# Patient Record
Sex: MALE | Race: WHITE | NOT HISPANIC OR LATINO | Employment: OTHER | URBAN - METROPOLITAN AREA
[De-identification: names, ages, dates, MRNs, and addresses within clinical notes are randomized per-mention and may not be internally consistent; named-entity substitution may affect disease eponyms.]

---

## 2023-01-01 ENCOUNTER — OFFICE VISIT (OUTPATIENT)
Dept: CARDIOLOGY | Facility: CLINIC | Age: 88
End: 2023-01-01
Payer: MEDICARE

## 2023-01-01 ENCOUNTER — TELEPHONE (OUTPATIENT)
Dept: PRIMARY CARE | Facility: CLINIC | Age: 88
End: 2023-01-01
Payer: MEDICARE

## 2023-01-01 VITALS — OXYGEN SATURATION: 93 % | HEART RATE: 88 BPM | SYSTOLIC BLOOD PRESSURE: 128 MMHG | DIASTOLIC BLOOD PRESSURE: 68 MMHG

## 2023-01-01 DIAGNOSIS — G92.8 TOXIC METABOLIC ENCEPHALOPATHY: ICD-10-CM

## 2023-01-01 DIAGNOSIS — D63.8 ANEMIA OF CHRONIC DISEASE: ICD-10-CM

## 2023-01-01 DIAGNOSIS — I35.0 NONRHEUMATIC AORTIC VALVE STENOSIS: ICD-10-CM

## 2023-01-01 DIAGNOSIS — I48.0 PAROXYSMAL ATRIAL FIBRILLATION (MULTI): ICD-10-CM

## 2023-01-01 DIAGNOSIS — I50.20 CONGESTIVE HEART FAILURE WITH LEFT VENTRICULAR SYSTOLIC DYSFUNCTION (LVSD) (MULTI): ICD-10-CM

## 2023-01-01 DIAGNOSIS — I48.91 ATRIAL FIBRILLATION, UNSPECIFIED TYPE (MULTI): Primary | ICD-10-CM

## 2023-01-01 DIAGNOSIS — I50.9 HEART FAILURE, UNSPECIFIED (MULTI): ICD-10-CM

## 2023-01-01 DIAGNOSIS — I73.9 PAD (PERIPHERAL ARTERY DISEASE) (CMS-HCC): ICD-10-CM

## 2023-01-01 DIAGNOSIS — I42.9 CARDIOMYOPATHY, UNSPECIFIED TYPE (MULTI): ICD-10-CM

## 2023-01-01 DIAGNOSIS — I50.42 CHRONIC COMBINED SYSTOLIC AND DIASTOLIC CONGESTIVE HEART FAILURE (MULTI): Primary | ICD-10-CM

## 2023-01-01 PROCEDURE — 99214 OFFICE O/P EST MOD 30 MIN: CPT | Performed by: INTERNAL MEDICINE

## 2023-01-01 PROCEDURE — 1126F AMNT PAIN NOTED NONE PRSNT: CPT | Performed by: INTERNAL MEDICINE

## 2023-01-01 PROCEDURE — 1159F MED LIST DOCD IN RCRD: CPT | Performed by: INTERNAL MEDICINE

## 2023-01-01 RX ORDER — FERROUS SULFATE 325(65) MG
1 TABLET, DELAYED RELEASE (ENTERIC COATED) ORAL EVERY OTHER DAY
COMMUNITY
End: 2023-01-01 | Stop reason: SDUPTHER

## 2023-01-01 RX ORDER — ADHESIVE BANDAGE
5 BANDAGE TOPICAL 4 TIMES DAILY PRN
COMMUNITY
Start: 2022-02-03 | End: 2024-01-01 | Stop reason: ALTCHOICE

## 2023-01-01 RX ORDER — POTASSIUM CHLORIDE 20 MEQ/1
1 TABLET, EXTENDED RELEASE ORAL DAILY
COMMUNITY
Start: 2022-02-03 | End: 2023-01-01 | Stop reason: SDUPTHER

## 2023-01-01 RX ORDER — METOPROLOL SUCCINATE 50 MG/1
1 TABLET, EXTENDED RELEASE ORAL DAILY
COMMUNITY
Start: 2022-02-03

## 2023-01-01 RX ORDER — ACETAMINOPHEN 325 MG/1
2 TABLET ORAL AS NEEDED
COMMUNITY
Start: 2020-06-24

## 2023-01-01 RX ORDER — METOPROLOL TARTRATE 25 MG/1
TABLET, FILM COATED ORAL
COMMUNITY
End: 2024-01-01 | Stop reason: ALTCHOICE

## 2023-01-01 RX ORDER — DIGOXIN 125 MCG
125 TABLET ORAL DAILY
Qty: 90 TABLET | Refills: 2 | Status: SHIPPED | OUTPATIENT
Start: 2023-01-01 | End: 2023-01-01 | Stop reason: SDUPTHER

## 2023-01-01 RX ORDER — ATORVASTATIN CALCIUM 40 MG/1
1 TABLET, FILM COATED ORAL DAILY
COMMUNITY
Start: 2022-02-08

## 2023-01-01 RX ORDER — FUROSEMIDE 40 MG/1
1 TABLET ORAL DAILY
COMMUNITY
Start: 2022-02-03 | End: 2023-01-01 | Stop reason: SDUPTHER

## 2023-01-01 RX ORDER — LEVETIRACETAM 500 MG/1
1 TABLET ORAL 2 TIMES DAILY
COMMUNITY
Start: 2020-06-24

## 2023-01-01 RX ORDER — MUPIROCIN 20 MG/G
OINTMENT TOPICAL
COMMUNITY
Start: 2022-04-11 | End: 2024-01-01 | Stop reason: ALTCHOICE

## 2023-01-01 RX ORDER — DOCUSATE SODIUM 100 MG/1
1 CAPSULE, LIQUID FILLED ORAL EVERY OTHER DAY
COMMUNITY
Start: 2022-02-03 | End: 2024-01-01 | Stop reason: ALTCHOICE

## 2023-01-01 RX ORDER — IPRATROPIUM BROMIDE AND ALBUTEROL SULFATE 2.5; .5 MG/3ML; MG/3ML
SOLUTION RESPIRATORY (INHALATION) EVERY 6 HOURS
COMMUNITY
Start: 2022-02-03 | End: 2024-01-01 | Stop reason: ALTCHOICE

## 2023-01-01 RX ORDER — DOXYCYCLINE 100 MG/1
1 TABLET ORAL EVERY 12 HOURS
COMMUNITY
Start: 2022-05-18

## 2023-01-01 RX ORDER — OMEPRAZOLE 20 MG/1
1 CAPSULE, DELAYED RELEASE ORAL
COMMUNITY
Start: 2022-02-03 | End: 2023-01-01 | Stop reason: SDUPTHER

## 2023-01-01 RX ORDER — DIGOXIN 125 MCG
1 TABLET ORAL DAILY
COMMUNITY
Start: 2022-02-03 | End: 2023-01-01

## 2023-01-01 RX ORDER — DAPAGLIFLOZIN 5 MG/1
1 TABLET, FILM COATED ORAL DAILY
COMMUNITY

## 2023-01-01 RX ORDER — LOSARTAN POTASSIUM 25 MG/1
1 TABLET ORAL DAILY
COMMUNITY
Start: 2021-10-02 | End: 2023-01-01

## 2023-01-01 RX ORDER — FUROSEMIDE 40 MG/1
40 TABLET ORAL DAILY
Qty: 90 TABLET | Refills: 2 | Status: SHIPPED | OUTPATIENT
Start: 2023-01-01 | End: 2024-03-19

## 2023-01-01 RX ORDER — LANOLIN ALCOHOL/MO/W.PET/CERES
1 CREAM (GRAM) TOPICAL DAILY
COMMUNITY
End: 2024-01-01 | Stop reason: ALTCHOICE

## 2023-01-01 RX ORDER — POTASSIUM CHLORIDE 20 MEQ/1
20 TABLET, EXTENDED RELEASE ORAL DAILY
Qty: 90 TABLET | Refills: 2 | Status: SHIPPED | OUTPATIENT
Start: 2023-01-01 | End: 2024-03-19

## 2023-01-01 RX ORDER — DIGOXIN 125 MCG
125 TABLET ORAL DAILY
Qty: 90 TABLET | Refills: 2 | Status: SHIPPED | OUTPATIENT
Start: 2023-01-01 | End: 2024-03-19

## 2023-01-01 RX ORDER — OMEPRAZOLE 20 MG/1
20 CAPSULE, DELAYED RELEASE ORAL
Qty: 90 CAPSULE | Refills: 2 | Status: SHIPPED | OUTPATIENT
Start: 2023-01-01 | End: 2024-03-19

## 2023-01-01 RX ORDER — NAPROXEN SODIUM 220 MG/1
1 TABLET, FILM COATED ORAL DAILY
COMMUNITY

## 2023-01-01 RX ORDER — LOSARTAN POTASSIUM 25 MG/1
25 TABLET ORAL DAILY
Qty: 90 TABLET | Refills: 2 | Status: SHIPPED | OUTPATIENT
Start: 2023-01-01 | End: 2024-03-19

## 2023-01-01 RX ORDER — PANTOPRAZOLE SODIUM 40 MG/1
1 TABLET, DELAYED RELEASE ORAL DAILY
COMMUNITY
End: 2024-01-01 | Stop reason: WASHOUT

## 2023-01-01 RX ORDER — FERROUS SULFATE 325(65) MG
1 TABLET, DELAYED RELEASE (ENTERIC COATED) ORAL EVERY OTHER DAY
Qty: 36 TABLET | Refills: 2 | Status: SHIPPED | OUTPATIENT
Start: 2023-01-01 | End: 2024-03-19

## 2023-01-01 ASSESSMENT — ENCOUNTER SYMPTOMS
MYALGIAS: 0
WEIGHT GAIN: 0
WHEEZING: 0
OCCASIONAL FEELINGS OF UNSTEADINESS: 1
FEVER: 0
WEAKNESS: 0
PALPITATIONS: 0
WEIGHT LOSS: 0
NEAR-SYNCOPE: 0
DEPRESSION: 0
COUGH: 0
SYNCOPE: 0
IRREGULAR HEARTBEAT: 0
CLAUDICATION: 0
PND: 0
LOSS OF SENSATION IN FEET: 0
DIZZINESS: 0
SHORTNESS OF BREATH: 0
ORTHOPNEA: 0
DYSPNEA ON EXERTION: 0
DIAPHORESIS: 0

## 2023-01-01 ASSESSMENT — PAIN SCALES - GENERAL: PAINLEVEL: 0-NO PAIN

## 2023-08-29 PROBLEM — R73.01 IMPAIRED FASTING GLUCOSE: Status: ACTIVE | Noted: 2023-01-01

## 2023-08-29 PROBLEM — I42.9 CARDIOMYOPATHY (MULTI): Status: ACTIVE | Noted: 2023-01-01

## 2023-08-29 PROBLEM — R06.09 DOE (DYSPNEA ON EXERTION): Status: ACTIVE | Noted: 2023-01-01

## 2023-08-29 PROBLEM — R44.3 HALLUCINATIONS: Status: ACTIVE | Noted: 2023-01-01

## 2023-08-29 PROBLEM — A41.01 MSSA (METHICILLIN SUSCEPTIBLE STAPHYLOCOCCUS AUREUS) SEPTICEMIA (MULTI): Status: ACTIVE | Noted: 2023-01-01

## 2023-08-29 PROBLEM — I51.89 DIASTOLIC DYSFUNCTION: Status: ACTIVE | Noted: 2023-01-01

## 2023-08-29 PROBLEM — I50.20 CONGESTIVE HEART FAILURE WITH LEFT VENTRICULAR SYSTOLIC DYSFUNCTION (LVSD) (MULTI): Status: ACTIVE | Noted: 2023-01-01

## 2023-08-29 PROBLEM — I35.0 AORTIC STENOSIS: Status: ACTIVE | Noted: 2023-01-01

## 2023-08-29 PROBLEM — I60.9 INTRACRANIAL SUBARACHNOID HEMORRHAGE (MULTI): Status: ACTIVE | Noted: 2023-01-01

## 2023-08-29 PROBLEM — I87.2 PERIPHERAL VENOUS INSUFFICIENCY: Status: ACTIVE | Noted: 2023-01-01

## 2023-08-29 PROBLEM — A49.01 METHICILLIN SUSCEPTIBLE STAPHYLOCOCCUS AUREUS INFECTION: Status: ACTIVE | Noted: 2023-01-01

## 2023-08-29 PROBLEM — R41.82 ALTERED MENTAL STATUS: Status: ACTIVE | Noted: 2023-01-01

## 2023-08-29 PROBLEM — I35.9 AORTIC VALVE DISORDER: Status: ACTIVE | Noted: 2023-01-01

## 2023-08-29 PROBLEM — Z86.19 HISTORY OF INFECTIOUS DISEASE: Status: ACTIVE | Noted: 2023-01-01

## 2023-08-29 PROBLEM — E78.00 PURE HYPERCHOLESTEROLEMIA: Status: ACTIVE | Noted: 2023-01-01

## 2023-08-29 PROBLEM — T84.50XA PROSTHETIC JOINT INFECTION (CMS-HCC): Status: ACTIVE | Noted: 2023-01-01

## 2023-08-29 PROBLEM — J30.0 VASOMOTOR RHINITIS: Status: ACTIVE | Noted: 2023-01-01

## 2023-08-29 PROBLEM — R26.89 OTHER ABNORMALITIES OF GAIT AND MOBILITY: Status: ACTIVE | Noted: 2023-01-01

## 2023-08-29 PROBLEM — D63.8 ANEMIA OF CHRONIC DISEASE: Status: ACTIVE | Noted: 2023-01-01

## 2023-08-29 PROBLEM — M19.90 OSTEOARTHROSIS: Status: ACTIVE | Noted: 2023-01-01

## 2023-08-29 PROBLEM — I73.9 PAD (PERIPHERAL ARTERY DISEASE) (CMS-HCC): Status: ACTIVE | Noted: 2023-01-01

## 2023-08-29 PROBLEM — I48.91 AFIB (MULTI): Status: ACTIVE | Noted: 2023-01-01

## 2023-08-29 PROBLEM — E53.8 VITAMIN B12 DEFICIENCY: Status: ACTIVE | Noted: 2023-01-01

## 2023-08-29 PROBLEM — G92.8 TOXIC METABOLIC ENCEPHALOPATHY: Status: ACTIVE | Noted: 2023-01-01

## 2023-08-29 PROBLEM — C61 MALIGNANT TUMOR OF PROSTATE (MULTI): Status: ACTIVE | Noted: 2023-01-01

## 2023-08-29 PROBLEM — A41.9 SEPSIS (MULTI): Status: ACTIVE | Noted: 2023-01-01

## 2023-10-09 NOTE — TELEPHONE ENCOUNTER
Requesting 90 day refill for Digoxin 125 mcg once a day to go to [facility] on [address] in [place]. Lov 6/23. Nov 2/24.

## 2023-10-18 NOTE — TELEPHONE ENCOUNTER
[facility] requesting 90 day refills on Omeprazole 20 mg once a day, Klor-con 20 meq once a day, Furosemide 40 mg once a day, and ferrous sulfate 325 mg 1 tablet M-W-F. LOV 6/23. NOV 4/24.

## 2023-11-07 NOTE — PROGRESS NOTES
Subjective      Chief Complaint   Patient presents with    Follow-up        89-year-old male with history of atrial fibrillation status post Watchman left atrial appendage occluder placement remotely. He also has severe aortic stenosis. 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 He is rather debilitated from a functional standpoint. At one point he was evaluated by our structural heart team for TAVR. It was decided that he was not a candidate. He also has severe PAD for which he sees Dr Goldberg, had RLE atherectomy without stenting. I saw him 1 yr ago. He ambulates with a rolator, functionally limited more so by his knees than anything.          Review of Systems   Constitutional: Negative for diaphoresis, fever, weight gain and weight loss.   Eyes:  Negative for visual disturbance.   Cardiovascular:  Negative for chest pain, claudication, dyspnea on exertion, irregular heartbeat, leg swelling, near-syncope, orthopnea, palpitations, paroxysmal nocturnal dyspnea and syncope.   Respiratory:  Negative for cough, shortness of breath and wheezing.    Musculoskeletal:  Negative for muscle weakness and myalgias.   Neurological:  Negative for dizziness and weakness.   All other systems reviewed and are negative.       Past Medical History:   Diagnosis Date    Personal history of malignant neoplasm of prostate 02/03/2022    History of malignant neoplasm of prostate    Personal history of other diseases of the digestive system 02/03/2022    History of GI bleed        Past Surgical History:   Procedure Laterality Date    OTHER SURGICAL HISTORY  02/03/2022    Atrial appendage closure device insertion    OTHER SURGICAL HISTORY  02/03/2022    Prostatectomy    OTHER SURGICAL HISTORY  02/03/2022    Knee replacement    OTHER SURGICAL HISTORY  02/03/2022    Hip surgery    US GUIDED PERCUTANEOUS PLACEMENT  12/11/2020    US GUIDED PERCUTANEOUS PLACEMENT [facility] CL[facility]ICAL LEGAC[facility]        Social History      Socioeconomic History    Marital status:      Spouse name: Not on file    Number of children: Not on file    Years of education: Not on file    Highest education level: Not on file   Occupational History    Not on file   Tobacco Use    Smoking status: Never    Smokeless tobacco: Not on file   Substance and Sexual Activity    Alcohol use: Not on file    Drug use: Not on file    Sexual activity: Not on file   Other Topics Concern    Not on file   Social History Narrative    Not on file     Social Determinants of Health     Financial Resource Strain: Not on file   Food Insecurity: Not on file   Transportation Needs: Not on file   Physical Activity: Not on file   Stress: Not on file   Social Connections: Not on file   Intimate Partner Violence: Not on file   Housing Stability: Not on file        Family History   Problem Relation Name Age of Onset    Lung disease Mother          OBJECTIVE:    Vitals:    11/07/23 1015   BP: 128/68   Pulse: 88   SpO2: 93%        Vitals reviewed.   Constitutional:       Appearance: Normal and healthy appearance. Not in distress.   Pulmonary:      Effort: Pulmonary effort is normal.      Breath sounds: Normal breath sounds.   Cardiovascular:      Normal rate. Irregular rhythm. Normal S1. Normal distant S2.       Murmurs: There is a grade 3/6 mid to late systolic murmur at the URSB.      No gallop.  No click.   Pulses:     Intact distal pulses.   Edema:     Peripheral edema present.     Pretibial: bilateral trace pitting edema of the pretibial area.     Ankle: bilateral trace pitting edema of the ankle.     Feet: bilateral trace pitting edema of the feet.  Skin:     General: Skin is warm and dry.   Neurological:      General: No focal deficit present.          Lab Review:   Lab Results   Component Value Date     05/05/2023    K 3.6 05/05/2023     05/05/2023    CO2 31 05/05/2023    BUN 21 05/05/2023    CREATININE 1.0 05/05/2023    GLUCOSE 90 05/05/2023    CALCIUM 8.5  05/05/2023     Lab Results   Component Value Date    WBC 6.9 05/03/2023    HGB 12.6 (L) 05/03/2023    HCT 37.6 (L) 05/03/2023    .6 (H) 05/03/2023     05/03/2023     Lab Results   Component Value Date    CHOL 90 (L) 12/20/2022    TRIG 59 12/20/2022    HDL 41 12/20/2022       Lab Results   Component Value Date    LDLCALC 37 (L) 12/20/2022        Cardiomyopathy (CMS/HCC)  EF 25-30% on Echo 5/2023    Afib (CMS/HCC)  Stable, continue rate control. WATCHMAN LAAO device in situ    Aortic stenosis  Severe symptomatic stage D. Not a candidate for AVR unfortunately    Congestive heart failure with left ventricular systolic dysfunction (LVSD) (CMS/HCC)  Very close to his euvolemic state. Would continue his medical therapy as is which includes evidence supported BB, ARB, SGLT2-Inh. Continue daily lasix    PAD (peripheral artery disease) (CMS/HCC)  Asymptomatic. Continuue statin and asa

## 2023-11-07 NOTE — ASSESSMENT & PLAN NOTE
Very close to his euvolemic state. Would continue his medical therapy as is which includes evidence supported BB, ARB, SGLT2-Inh. Continue daily lasix

## 2024-01-01 ENCOUNTER — APPOINTMENT (OUTPATIENT)
Dept: WOUND CARE | Facility: HOSPITAL | Age: 89
End: 2024-01-01
Payer: MEDICARE

## 2024-01-01 ENCOUNTER — HOSPITAL ENCOUNTER (EMERGENCY)
Facility: HOSPITAL | Age: 89
Discharge: HOME | End: 2024-01-06
Attending: STUDENT IN AN ORGANIZED HEALTH CARE EDUCATION/TRAINING PROGRAM
Payer: MEDICARE

## 2024-01-01 ENCOUNTER — LAB (OUTPATIENT)
Dept: LAB | Facility: LAB | Age: 89
End: 2024-01-01
Payer: MEDICARE

## 2024-01-01 ENCOUNTER — APPOINTMENT (OUTPATIENT)
Dept: RADIOLOGY | Facility: HOSPITAL | Age: 89
End: 2024-01-01
Payer: MEDICARE

## 2024-01-01 ENCOUNTER — TELEPHONE (OUTPATIENT)
Dept: PRIMARY CARE | Facility: CLINIC | Age: 89
End: 2024-01-01
Payer: MEDICARE

## 2024-01-01 ENCOUNTER — TELEPHONE (OUTPATIENT)
Dept: PRIMARY CARE | Facility: CLINIC | Age: 89
End: 2024-01-01

## 2024-01-01 ENCOUNTER — OFFICE VISIT (OUTPATIENT)
Dept: PRIMARY CARE | Facility: CLINIC | Age: 89
End: 2024-01-01
Payer: MEDICARE

## 2024-01-01 ENCOUNTER — OFFICE VISIT (OUTPATIENT)
Dept: ORTHOPEDIC SURGERY | Facility: CLINIC | Age: 89
End: 2024-01-01
Payer: MEDICARE

## 2024-01-01 VITALS
TEMPERATURE: 97.5 F | OXYGEN SATURATION: 96 % | DIASTOLIC BLOOD PRESSURE: 52 MMHG | HEIGHT: 70 IN | HEART RATE: 91 BPM | BODY MASS INDEX: 24.91 KG/M2 | WEIGHT: 174 LBS | SYSTOLIC BLOOD PRESSURE: 108 MMHG

## 2024-01-01 VITALS
WEIGHT: 180 LBS | DIASTOLIC BLOOD PRESSURE: 96 MMHG | RESPIRATION RATE: 16 BRPM | OXYGEN SATURATION: 94 % | TEMPERATURE: 96.8 F | HEIGHT: 70 IN | BODY MASS INDEX: 25.77 KG/M2 | HEART RATE: 64 BPM | SYSTOLIC BLOOD PRESSURE: 135 MMHG

## 2024-01-01 DIAGNOSIS — M17.11 ARTHRITIS OF RIGHT KNEE: Primary | ICD-10-CM

## 2024-01-01 DIAGNOSIS — E78.00 PURE HYPERCHOLESTEROLEMIA, UNSPECIFIED: Primary | ICD-10-CM

## 2024-01-01 DIAGNOSIS — I48.0 PAF (PAROXYSMAL ATRIAL FIBRILLATION) (MULTI): ICD-10-CM

## 2024-01-01 DIAGNOSIS — I10 ESSENTIAL HYPERTENSION: ICD-10-CM

## 2024-01-01 DIAGNOSIS — R73.01 IMPAIRED FASTING BLOOD SUGAR: ICD-10-CM

## 2024-01-01 DIAGNOSIS — I50.9 HEART FAILURE, UNSPECIFIED (MULTI): ICD-10-CM

## 2024-01-01 DIAGNOSIS — I50.20 CONGESTIVE HEART FAILURE WITH LEFT VENTRICULAR SYSTOLIC DYSFUNCTION (LVSD) (MULTI): Primary | ICD-10-CM

## 2024-01-01 DIAGNOSIS — S81.811A SKIN TEAR OF RIGHT LOWER LEG WITHOUT COMPLICATION, INITIAL ENCOUNTER: Primary | ICD-10-CM

## 2024-01-01 DIAGNOSIS — L98.491 SKIN ULCER, LIMITED TO BREAKDOWN OF SKIN (MULTI): ICD-10-CM

## 2024-01-01 DIAGNOSIS — R22.41 LOCALIZED SWELLING OF RIGHT LOWER LEG: ICD-10-CM

## 2024-01-01 DIAGNOSIS — L97.909 ULCER OF LOWER EXTREMITY, UNSPECIFIED LATERALITY, UNSPECIFIED ULCER STAGE (MULTI): ICD-10-CM

## 2024-01-01 DIAGNOSIS — E78.2 MIXED HYPERLIPIDEMIA: ICD-10-CM

## 2024-01-01 DIAGNOSIS — R73.01 IMPAIRED FASTING GLUCOSE: ICD-10-CM

## 2024-01-01 DIAGNOSIS — A41.01 MSSA (METHICILLIN SUSCEPTIBLE STAPHYLOCOCCUS AUREUS) SEPTICEMIA (MULTI): ICD-10-CM

## 2024-01-01 DIAGNOSIS — I73.9 PAD (PERIPHERAL ARTERY DISEASE) (CMS-HCC): ICD-10-CM

## 2024-01-01 DIAGNOSIS — G89.29 CHRONIC PAIN OF RIGHT KNEE: ICD-10-CM

## 2024-01-01 DIAGNOSIS — M15.9 PRIMARY OSTEOARTHRITIS INVOLVING MULTIPLE JOINTS: ICD-10-CM

## 2024-01-01 DIAGNOSIS — M25.561 CHRONIC PAIN OF RIGHT KNEE: ICD-10-CM

## 2024-01-01 LAB
ALBUMIN SERPL-MCNC: 3.7 G/DL (ref 3.5–5)
ALP BLD-CCNC: 97 U/L (ref 35–125)
ALT SERPL-CCNC: 12 U/L (ref 5–40)
ANION GAP SERPL CALC-SCNC: 11 MMOL/L
ANION GAP SERPL CALC-SCNC: 15 MMOL/L
AST SERPL-CCNC: 21 U/L (ref 5–40)
BILIRUB SERPL-MCNC: 0.8 MG/DL (ref 0.1–1.2)
BUN SERPL-MCNC: 19 MG/DL (ref 8–25)
BUN SERPL-MCNC: 21 MG/DL (ref 8–25)
CALCIUM SERPL-MCNC: 8.6 MG/DL (ref 8.5–10.4)
CALCIUM SERPL-MCNC: 8.8 MG/DL (ref 8.5–10.4)
CHLORIDE SERPL-SCNC: 101 MMOL/L (ref 97–107)
CHLORIDE SERPL-SCNC: 102 MMOL/L (ref 97–107)
CHOLEST SERPL-MCNC: 70 MG/DL (ref 133–200)
CHOLEST/HDLC SERPL: 2.1 {RATIO}
CO2 SERPL-SCNC: 26 MMOL/L (ref 24–31)
CO2 SERPL-SCNC: 28 MMOL/L (ref 24–31)
CREAT SERPL-MCNC: 0.9 MG/DL (ref 0.4–1.6)
CREAT SERPL-MCNC: 0.9 MG/DL (ref 0.4–1.6)
DIGOXIN SERPL-MCNC: 1.2 NG/ML (ref 0.7–2.1)
EGFRCR SERPLBLD CKD-EPI 2021: 80 ML/MIN/1.73M*2
ERYTHROCYTE [DISTWIDTH] IN BLOOD BY AUTOMATED COUNT: 15.4 % (ref 11.5–14.5)
ERYTHROCYTE [DISTWIDTH] IN BLOOD BY AUTOMATED COUNT: 16.3 % (ref 11.5–14.5)
EST. AVERAGE GLUCOSE BLD GHB EST-MCNC: 117 MG/DL
GFR SERPL CREATININE-BSD FRML MDRD: 80 ML/MIN/1.73M*2
GLUCOSE SERPL-MCNC: 147 MG/DL (ref 65–99)
GLUCOSE SERPL-MCNC: 97 MG/DL (ref 65–99)
HBA1C MFR BLD: 5.7 %
HCT VFR BLD AUTO: 42.7 % (ref 41–52)
HCT VFR BLD AUTO: 45.9 % (ref 41–52)
HDLC SERPL-MCNC: 34 MG/DL
HGB BLD-MCNC: 13.6 G/DL (ref 13.5–17.5)
HGB BLD-MCNC: 13.9 G/DL (ref 13.5–17.5)
LDLC SERPL CALC-MCNC: 25 MG/DL (ref 65–130)
MCH RBC QN AUTO: 33.9 PG (ref 26–34)
MCH RBC QN AUTO: 35.2 PG (ref 26–34)
MCHC RBC AUTO-ENTMCNC: 30.3 G/DL (ref 32–36)
MCHC RBC AUTO-ENTMCNC: 31.9 G/DL (ref 32–36)
MCV RBC AUTO: 111 FL (ref 80–100)
MCV RBC AUTO: 112 FL (ref 80–100)
NRBC BLD-RTO: 0 /100 WBCS (ref 0–0)
NRBC BLD-RTO: 0 /100 WBCS (ref 0–0)
PLATELET # BLD AUTO: 209 X10*3/UL (ref 150–450)
PLATELET # BLD AUTO: 226 X10*3/UL (ref 150–450)
POTASSIUM SERPL-SCNC: 4.2 MMOL/L (ref 3.4–5.1)
POTASSIUM SERPL-SCNC: 4.8 MMOL/L (ref 3.4–5.1)
PROT SERPL-MCNC: 7.3 G/DL (ref 5.9–7.9)
RBC # BLD AUTO: 3.86 X10*6/UL (ref 4.5–5.9)
RBC # BLD AUTO: 4.1 X10*6/UL (ref 4.5–5.9)
SODIUM SERPL-SCNC: 141 MMOL/L (ref 133–145)
SODIUM SERPL-SCNC: 142 MMOL/L (ref 133–145)
TRIGL SERPL-MCNC: 56 MG/DL (ref 40–150)
WBC # BLD AUTO: 6 X10*3/UL (ref 4.4–11.3)
WBC # BLD AUTO: 6.2 X10*3/UL (ref 4.4–11.3)

## 2024-01-01 PROCEDURE — 36415 COLL VENOUS BLD VENIPUNCTURE: CPT

## 2024-01-01 PROCEDURE — 1126F AMNT PAIN NOTED NONE PRSNT: CPT | Performed by: ORTHOPAEDIC SURGERY

## 2024-01-01 PROCEDURE — 80053 COMPREHEN METABOLIC PANEL: CPT

## 2024-01-01 PROCEDURE — 93971 EXTREMITY STUDY: CPT

## 2024-01-01 PROCEDURE — 1160F RVW MEDS BY RX/DR IN RCRD: CPT | Performed by: INTERNAL MEDICINE

## 2024-01-01 PROCEDURE — 99284 EMERGENCY DEPT VISIT MOD MDM: CPT | Performed by: STUDENT IN AN ORGANIZED HEALTH CARE EDUCATION/TRAINING PROGRAM

## 2024-01-01 PROCEDURE — 1036F TOBACCO NON-USER: CPT | Performed by: INTERNAL MEDICINE

## 2024-01-01 PROCEDURE — 85027 COMPLETE CBC AUTOMATED: CPT

## 2024-01-01 PROCEDURE — 99213 OFFICE O/P EST LOW 20 MIN: CPT | Performed by: INTERNAL MEDICINE

## 2024-01-01 PROCEDURE — 80061 LIPID PANEL: CPT

## 2024-01-01 PROCEDURE — 1159F MED LIST DOCD IN RCRD: CPT | Performed by: INTERNAL MEDICINE

## 2024-01-01 PROCEDURE — 1159F MED LIST DOCD IN RCRD: CPT | Performed by: ORTHOPAEDIC SURGERY

## 2024-01-01 PROCEDURE — 85027 COMPLETE CBC AUTOMATED: CPT | Performed by: NURSE PRACTITIONER

## 2024-01-01 PROCEDURE — 20610 DRAIN/INJ JOINT/BURSA W/O US: CPT | Performed by: ORTHOPAEDIC SURGERY

## 2024-01-01 PROCEDURE — 80048 BASIC METABOLIC PNL TOTAL CA: CPT | Performed by: NURSE PRACTITIONER

## 2024-01-01 PROCEDURE — 80162 ASSAY OF DIGOXIN TOTAL: CPT

## 2024-01-01 PROCEDURE — 1157F ADVNC CARE PLAN IN RCRD: CPT | Performed by: INTERNAL MEDICINE

## 2024-01-01 PROCEDURE — 3078F DIAST BP <80 MM HG: CPT | Performed by: INTERNAL MEDICINE

## 2024-01-01 PROCEDURE — 3074F SYST BP LT 130 MM HG: CPT | Performed by: INTERNAL MEDICINE

## 2024-01-01 PROCEDURE — 99213 OFFICE O/P EST LOW 20 MIN: CPT | Performed by: ORTHOPAEDIC SURGERY

## 2024-01-01 PROCEDURE — 73590 X-RAY EXAM OF LOWER LEG: CPT | Mod: RT

## 2024-01-01 PROCEDURE — 36415 COLL VENOUS BLD VENIPUNCTURE: CPT | Performed by: NURSE PRACTITIONER

## 2024-01-01 PROCEDURE — 83036 HEMOGLOBIN GLYCOSYLATED A1C: CPT

## 2024-01-01 PROCEDURE — 1125F AMNT PAIN NOTED PAIN PRSNT: CPT | Performed by: INTERNAL MEDICINE

## 2024-01-01 RX ORDER — TRIAMCINOLONE ACETONIDE 40 MG/ML
40 INJECTION, SUSPENSION INTRA-ARTICULAR; INTRAMUSCULAR
Status: COMPLETED | OUTPATIENT
Start: 2024-01-01 | End: 2024-01-01

## 2024-01-01 RX ORDER — BUPIVACAINE HYDROCHLORIDE 2.5 MG/ML
1 INJECTION, SOLUTION INFILTRATION; PERINEURAL
Status: COMPLETED | OUTPATIENT
Start: 2024-01-01 | End: 2024-01-01

## 2024-01-01 RX ADMIN — TRIAMCINOLONE ACETONIDE 40 MG: 40 INJECTION, SUSPENSION INTRA-ARTICULAR; INTRAMUSCULAR at 10:17

## 2024-01-01 RX ADMIN — BUPIVACAINE HYDROCHLORIDE 1 ML: 2.5 INJECTION, SOLUTION INFILTRATION; PERINEURAL at 10:17

## 2024-01-01 ASSESSMENT — PATIENT HEALTH QUESTIONNAIRE - PHQ9
SUM OF ALL RESPONSES TO PHQ9 QUESTIONS 1 & 2: 0
1. LITTLE INTEREST OR PLEASURE IN DOING THINGS: NOT AT ALL
1. LITTLE INTEREST OR PLEASURE IN DOING THINGS: NOT AT ALL
2. FEELING DOWN, DEPRESSED OR HOPELESS: NOT AT ALL
SUM OF ALL RESPONSES TO PHQ9 QUESTIONS 1 AND 2: 0
2. FEELING DOWN, DEPRESSED OR HOPELESS: NOT AT ALL

## 2024-01-01 ASSESSMENT — ENCOUNTER SYMPTOMS
DEPRESSION: 0
PALPITATIONS: 0
SHORTNESS OF BREATH: 0
OCCASIONAL FEELINGS OF UNSTEADINESS: 1
KNEE SWELLING: 1
LOSS OF SENSATION IN FEET: 0

## 2024-01-01 ASSESSMENT — PAIN DESCRIPTION - PROGRESSION: CLINICAL_PROGRESSION: NOT CHANGED

## 2024-01-01 ASSESSMENT — PAIN SCALES - GENERAL
PAINLEVEL_OUTOF10: 7
PAINLEVEL_OUTOF10: 4
PAINLEVEL: 5

## 2024-01-01 ASSESSMENT — COLUMBIA-SUICIDE SEVERITY RATING SCALE - C-SSRS
6. HAVE YOU EVER DONE ANYTHING, STARTED TO DO ANYTHING, OR PREPARED TO DO ANYTHING TO END YOUR LIFE?: NO
1. IN THE PAST MONTH, HAVE YOU WISHED YOU WERE DEAD OR WISHED YOU COULD GO TO SLEEP AND NOT WAKE UP?: NO
2. HAVE YOU ACTUALLY HAD ANY THOUGHTS OF KILLING YOURSELF?: NO

## 2024-01-01 ASSESSMENT — PAIN DESCRIPTION - LOCATION: LOCATION: LEG

## 2024-01-01 ASSESSMENT — PAIN DESCRIPTION - FREQUENCY: FREQUENCY: CONSTANT/CONTINUOUS

## 2024-01-01 ASSESSMENT — PAIN DESCRIPTION - ORIENTATION: ORIENTATION: RIGHT

## 2024-01-01 ASSESSMENT — PAIN DESCRIPTION - PAIN TYPE: TYPE: ACUTE PAIN

## 2024-01-01 ASSESSMENT — PAIN DESCRIPTION - ONSET: ONSET: SUDDEN

## 2024-01-01 ASSESSMENT — PAIN DESCRIPTION - DESCRIPTORS
DESCRIPTORS: ACHING;DISCOMFORT
DESCRIPTORS: TIGHTNESS

## 2024-01-01 ASSESSMENT — PAIN - FUNCTIONAL ASSESSMENT
PAIN_FUNCTIONAL_ASSESSMENT: 0-10
PAIN_FUNCTIONAL_ASSESSMENT: 0-10

## 2024-01-04 NOTE — TELEPHONE ENCOUNTER
With his history home wound treatment I would not recommend need to get in to Wound Clinic [facility].

## 2024-01-04 NOTE — TELEPHONE ENCOUNTER
Spouse states pt has wound about size of half dollar on leg, the skin is ripped off and she has been treating with neosporin.  States wound just not healing and pt will not go to ER.  Asking if nurse could come out to house to treat this wound as it is very difficult to get pt out d/t age/medical hx.  Please advise further instructions. Ph: [phone]

## 2024-01-04 NOTE — TELEPHONE ENCOUNTER
Spoke with spouse and made aware that she will need to take patient to wound clinic, stated that it's going to be extremely difficult however she will do it if it needs done, advised that she will just have to call wound clinic to schedule, states thanks

## 2024-01-06 NOTE — ED PROVIDER NOTES
HPI   Chief Complaint   Patient presents with    Leg Swelling     Injured my rt leg about a week ago and had it bandaged up now my rt leg is swollen and there is weeping        HPI  See my MDM                  [place] Coma Scale Score: 15                  Patient History   Past Medical History:   Diagnosis Date    Personal history of malignant neoplasm of prostate 02/03/2022    History of malignant neoplasm of prostate    Personal history of other diseases of the digestive system 02/03/2022    History of GI bleed     Past Surgical History:   Procedure Laterality Date    OTHER SURGICAL HISTORY  02/03/2022    Atrial appendage closure device insertion    OTHER SURGICAL HISTORY  02/03/2022    Prostatectomy    OTHER SURGICAL HISTORY  02/03/2022    Knee replacement    OTHER SURGICAL HISTORY  02/03/2022    Hip surgery    US GUIDED PERCUTANEOUS PLACEMENT  12/11/2020    US GUIDED PERCUTANEOUS PLACEMENT [facility]INICAL LEGAC[facility]     Family History   Problem Relation Name Age of Onset    Lung disease Mother       Social History     Tobacco Use    Smoking status: Never    Smokeless tobacco: Not on file   Substance Use Topics    Alcohol use: Not on file    Drug use: Not on file       Physical Exam   ED Triage Vitals [01/06/24 1030]   Temp Heart Rate Resp BP   36 °C (96.8 °F) 88 18 121/76      SpO2 Temp Source Heart Rate Source Patient Position   96 % Temporal Monitor Sitting      BP Location FiO2 (%)     Left arm --       Physical Exam  CONSTITUTIONAL: Vital signs reviewed as charted, well-developed and in no distress  Eyes: Extraocular muscles are intact. Pupils equal round and reactive to light. Conjunctiva are pink.    ENT: Mucous membranes are moist. Tongue in the midline. Pharynx was without erythema or exudates, uvula midline  LUNGS: Breath sounds equal and clear to auscultation. Good air exchange, no wheezes rales or retractions, pulse oximetry is charted.  HEART: Regular rate and rhythm without murmur thrill or rub, strong  tones, auscultation is normal.  ABDOMEN: Soft and nontender without guarding rebound rigidity or mass. Bowel sounds are present and normal in all quadrants. There is no palpable masses or aneurysms identified. No hepatosplenomegaly, normal abdominal exam.  Neuro: The patient is awake, alert and oriented ×3. Moving all 4 extremities and answering questions appropriately.   MUSCULOSKELETAL: The calves are nontender to palpation. Full gross active range of motion.   PSYCH: Awake alert oriented, normal mood and affect.  Skin:  Dry, normal color, warm to the touch, no rash present.  Bilateral lower extremity edema with a right ankle shaped skin tear to the anterior lateral mid right lower leg there is some purulent drainage on the bandage present.  Motor sensation pulses are intact distally.    ED Course & MDM   Diagnoses as of 01/06/24 1228   Skin tear of right lower leg without complication, initial encounter   Localized swelling of right lower leg       Medical Decision Making  History obtained from: patient    Vital signs, nursing notes, current medications, past medical history, Surgical history, allergies, social history, family History were reviewed.         HPI:  Patient is an 89-year-old gentleman presenting emergency room today for evaluation of a right lower leg wound.  States happened about a week ago when he bumped his leg into the bed frame.  He does have a right ankle shaped skin tear present.  There is some purulent drainage noted on bandage.  Denies dizziness, chest pain, shortness of breath or abdominal pain.  Does have chronic lower extremity edema.  Denies cough or congestion.  Denies fevers chills or night sweats.  He is nontoxic well-appearing      10 point ROS was reviewed and negative except Noted above in HPI.  DDX: as listed above    X-ray of the right tib-fib interpreted by the radiologist: No acute bony abnormality  Ultrasound of the right lower extremity third by the radiologist shows no deep  venous thrombosis  Medications administered during this visit (name and route): ###      MDM Summary/considerations:  I estimate there is LOW risk for CELLULITIS, COMPARTMENT SYNDROME, NECROTIZING FASCIITIS, TENDON OR NEUROVASCULAR INJURY, or FOREIGN BODY, thus I consider the discharge disposition reasonable. Also, there is no evidence for peritonitis, sepsis, or toxicity. We have discussed the diagnosis and risks, and we agree with discharging home to follow-up with their primary doctor. We also discussed returning to the Emergency Department immediately if new or worsening symptoms occur. We have discussed the symptoms which are most concerning (e.g., changing or worsening pain, fever, numbness, weakness, cool or painful digits) that necessitate immediate return.         Patient's wound is well-appearing, does currently take doxycycline twice daily.  X-ray and ultrasound both noral.  Patient's wife was present when I came back to reassess her concern was she thought the leg was little more swollen than it normally was.  The patient states that she made him come here to the ER.  Will be discharged home follow PCP 1 to 2 days for reevaluation.  Was discharged home in stable condition.    I saw this patient in conjunction with Dr. Welch, please see his supervision note.    All of the patient's questions were answered to the best of my ability.  Patient states understanding that they have been screened for an emergency today and we have not found any etiology of symptoms that requires emergent treatment or admission to the hospital at this point. They understand that they have not had definitive care day and require follow-up for treatment of their condition. They also state understanding that they may have an emergent condition that may potentially have not of detected at this visit and they must return to the emergency department if they develop any worsening of symptoms or new complaints.      Critical Care: Not  warranted at this time    Prescriptions provided include: none    This chart was completed using voice recognition transcription software. Please excuse any errors of transcription including grammatical, punctuation, syntax and spelling errors.  Please contact me with any questions regarding this chart.    Procedure  Procedures     KEYANA Steele-ASHER  01/06/24 [id number]

## 2024-01-06 NOTE — DISCHARGE INSTRUCTIONS
Follow-up with your primary care physician, return to the emergency department for any new or worsening symptoms including severe worsening of swelling of the leg, spreading redness from the site or drainage of pus from the skin tear which could be signs of infection especially if it is tender to the touch and warm, this would require additional antibiotic treatment.  Otherwise continue taking care of your wound as you have been doing until resolution.

## 2024-01-11 NOTE — PROGRESS NOTES
Subjective    Patient ID: Riky Leal is a 89 y.o. male.    Chief Complaint: Pain of the Right Knee (OSTEOARTHRITIS, REQUESTING INJECTION, LAST INJECTION 9/26/2023)         Right Knee       This 89-year-old male is here complaining once again of right knee pain.  He has a history of severe degenerative arthritis in the right knee and has had previous injections with good results.  He is here complaining once again of right knee pain and stiffness and is requesting an injection.  He does not want to have surgery if at all possible.    Objective   Ortho Exam  On examination he is noted to be a well-developed male.  Examination of his right knee reveals there to be a range of motion of 20 degrees loss of full extension and flexion of 110 degrees.  There is no significant effusion.  There is no varus or valgus instability noted.  He has medial joint line pain to palpation.  He has trophic changes of both lower extremities.  Image Results:  Lower extremity venous duplex right  Narrative: Interpreted By:  Matilda Cordoba,   STUDY:  [facility] US LOWER EXTREMITY VENOUS DUPLEX RIGHT  1/6/2024 12:10 pm      INDICATION:  89 y/o   M with  Signs/Symptoms:edema.      COMPARISON:  None.      ACCESSION NUMBER(S):  [id number]      ORDERING CLINICIAN:  RIVER BARTON      TECHNIQUE:  Routine ultrasound of the  right lower extremity was performed with  duplex Doppler (color and spectral) evaluation.   Static images were  obtained for remote interpretation.      FINDINGS:  THIGH VEINS:  The common femoral, femoral, popliteal, proximal medial  saphenous, and deep femoral veins are patent and free of thrombus.  The veins are normally compressible.  They demonstrate normal phasic  flow and augmentation response.      CALF VEINS:  The paired peroneal and posterior tibial calf veins are  patent.      Impression: Negative study.  No deep venous thrombosis of the  right lower  extremity.      MACRO:  None      Signed by: Matilda Cordoba  1/6/2024 12:13 PM  Dictation workstation:   [id number]  XR tibia fibula right 2 views  Narrative: Interpreted By:  Matilda Cordoba,   STUDY:  XR TIBIA FIBULA RIGHT 2 VIEWS 1/6/2024 11:33 am      INDICATION:  Pain      COMPARISON:  None.      ACCESSION NUMBER(S):  [id number]      ORDERING CLINICIAN:  RIVER BARTON      TECHNIQUE:  Two views of the Right tibia/fibula were performed.      FINDINGS:  Bones: The bones appear intact.      Joints: The joints are maintained.      Soft tissues: Unremarkable.      Impression: No acute bony abnormality.      MACRO:  None.      Signed by: Matilda Cordoba 1/6/2024 11:37 AM  Dictation workstation:   [id number]      Assessment/Plan   Encounter Diagnoses:  Arthritis of right knee    Chronic pain of right knee    I discussed his clinical findings as well as previous x-ray findings with him.  He is here requesting an injection and I agreed.  Patient ID: Riky Leal is a 89 y.o. male.    L Inj/Asp: R knee on 1/11/2024 10:17 AM  Indications: pain  Details: 25 G needle, anteromedial approach  Medications: 40 mg triamcinolone acetonide 40 mg/mL; 1 mL BUPivacaine HCl 0.25 % (2.5 mg/mL)  Outcome: tolerated well, no immediate complications  Procedure, treatment alternatives, risks and benefits explained, specific risks discussed. Consent was given by the patient. Immediately prior to procedure a time out was called to verify the correct patient, procedure, equipment, support staff and site/side marked as required. Patient was prepped and draped in the usual sterile fashion.       I again discussed the fact that these can be done every 3 months if necessary.  He is to return on an as-needed basis.

## 2024-02-14 PROBLEM — I48.0 PAROXYSMAL ATRIAL FIBRILLATION (MULTI): Status: ACTIVE | Noted: 2023-01-01

## 2024-02-14 PROBLEM — I11.0 HYPERTENSIVE HEART DISEASE WITH HEART FAILURE (MULTI): Status: ACTIVE | Noted: 2023-01-01

## 2024-02-14 PROBLEM — E66.3 OVERWEIGHT WITH BODY MASS INDEX (BMI) 25.0-29.9: Status: ACTIVE | Noted: 2024-01-01

## 2024-02-14 PROBLEM — M19.90 CHRONIC ARTHRITIS: Status: ACTIVE | Noted: 2024-01-01

## 2024-02-14 PROBLEM — R40.1 CLOUDED CONSCIOUSNESS: Status: ACTIVE | Noted: 2024-01-01

## 2024-02-14 PROBLEM — M76.899 ENTHESOPATHY OF HIP REGION: Status: ACTIVE | Noted: 2024-01-01

## 2024-02-14 PROBLEM — Z96.659 HISTORY OF TOTAL KNEE REPLACEMENT: Status: ACTIVE | Noted: 2022-11-10

## 2024-02-14 PROBLEM — R53.83 FATIGUE: Status: ACTIVE | Noted: 2024-01-01

## 2024-02-14 PROBLEM — K26.9 DUODENAL ULCER: Status: ACTIVE | Noted: 2024-01-01

## 2024-02-14 PROBLEM — J96.00 ACUTE RESPIRATORY FAILURE (MULTI): Status: ACTIVE | Noted: 2023-01-01

## 2024-02-14 PROBLEM — J90 BILATERAL PLEURAL EFFUSION: Status: ACTIVE | Noted: 2024-01-01

## 2024-02-14 PROBLEM — M16.12 ARTHRITIS OF LEFT HIP: Status: ACTIVE | Noted: 2024-01-01

## 2024-02-14 PROBLEM — R26.2 UNABLE TO WALK: Status: ACTIVE | Noted: 2024-01-01

## 2024-02-14 PROBLEM — N17.9 ACUTE RENAL FAILURE (CMS-HCC): Status: ACTIVE | Noted: 2024-01-01

## 2024-02-14 PROBLEM — M25.551 PAIN OF RIGHT HIP JOINT: Status: ACTIVE | Noted: 2024-01-01

## 2024-02-14 PROBLEM — L98.499 SKIN ULCER (MULTI): Status: ACTIVE | Noted: 2024-01-01

## 2024-02-14 PROBLEM — E87.20 LACTIC ACIDOSIS: Status: ACTIVE | Noted: 2024-01-01

## 2024-02-14 PROBLEM — B02.9 HERPES ZOSTER: Status: ACTIVE | Noted: 2024-01-01

## 2024-02-14 PROBLEM — M25.519 ARTHRALGIA OF SHOULDER: Status: ACTIVE | Noted: 2024-01-01

## 2024-02-14 PROBLEM — G89.29 CHRONIC PAIN: Status: ACTIVE | Noted: 2024-01-01

## 2024-02-14 PROBLEM — S81.812A LACERATION WITHOUT FOREIGN BODY, LEFT LOWER LEG, INITIAL ENCOUNTER: Status: ACTIVE | Noted: 2024-01-01

## 2024-02-14 PROBLEM — M25.569 KNEE PAIN: Status: ACTIVE | Noted: 2023-01-01

## 2024-02-14 PROBLEM — Z85.46 HISTORY OF MALIGNANT NEOPLASM OF PROSTATE: Status: ACTIVE | Noted: 2023-01-01

## 2024-02-14 PROBLEM — R41.89 IMPAIRED COGNITION: Status: ACTIVE | Noted: 2024-01-01

## 2024-02-14 PROBLEM — M17.11 PRIMARY OSTEOARTHRITIS OF RIGHT KNEE: Status: ACTIVE | Noted: 2024-01-01

## 2024-02-14 PROBLEM — Z66 DO NOT RESUSCITATE: Status: ACTIVE | Noted: 2023-01-01

## 2024-02-14 PROBLEM — Z96.649 HISTORY OF TOTAL HIP REPLACEMENT: Status: ACTIVE | Noted: 2023-01-01

## 2024-02-14 PROBLEM — M54.9 BACK PAIN: Status: ACTIVE | Noted: 2024-01-01
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
## 2024-02-14 PROBLEM — R42 DIZZINESS: Status: ACTIVE | Noted: 2024-01-01

## 2024-02-14 PROBLEM — S42.009A FRACTURE OF CLAVICLE: Status: ACTIVE | Noted: 2024-01-01

## 2024-02-14 PROBLEM — R26.89 IMPAIRMENT OF BALANCE: Status: ACTIVE | Noted: 2024-01-01

## 2024-02-20 NOTE — PROGRESS NOTES
[facility]: [facility]  PROGRESS NOTE      Riky Leal is a 89 y.o. male that is presenting today for Congestive Heart Failure (7 mo fu).    Assessment/Plan   Diagnoses and all orders for this visit:  Congestive heart failure with left ventricular systolic dysfunction (LVSD) (CMS/[facility])  Comments:  Risk factor control.  Essential hypertension  Comments:  BP doing OK.  PAD (peripheral artery disease) (CMS/[facility])  MSSA (methicillin susceptible Staphylococcus aureus) septicemia (CMS/[facility])  Comments:  Doxycyline life long prophylaxis.  Skin ulcer, limited to breakdown of skin (CMS/[facility])  Comments:  Right shin skin tear. Use Xeroform gauze with Koban wrap. Soap /water daily.  Primary osteoarthritis involving multiple joints  Comments:  Dr.William Dixon [phone] [place] is good.  Impaired fasting blood sugar  -     CBC; Future  -     Comprehensive Metabolic Panel; Future  -     Hemoglobin A1C; Future  PAF (paroxysmal atrial fibrillation) (CMS/[facility])  -     Digoxin level; Future  Mixed hyperlipidemia  -     Lipid Panel; Future    Subjective   Walker and office.  Overall patient doing poorly slow decline congestive heart failure systolic and diastolic component history of paroxysmal atrial fibrillation status post watchman.  Is at home with caregivers.  Lab work is doing fairly well hydration was reviewed medications reviewed recheck blood work 1 to 2 weeks prior to follow-up.  Right shin wound skin tear recommend soap and water wash Curlex and Xeroform gauze.  Change daily until healed return to clinic with Dr. Ortiz new patient blood work 1 to 2 weeks prior and    Congestive Heart Failure  Pertinent negatives include no chest pain, palpitations or shortness of breath.     Review of Systems   Respiratory:  Negative for shortness of breath.    Cardiovascular:  Negative for chest pain and palpitations.   All other systems reviewed and are negative.     Objective   Vitals:    02/20/24 1135   BP: 108/52    Pulse: 91   Temp: 36.4 °C (97.5 °F)   SpO2: 96%      Body mass index is 24.97 kg/m².  Physical Exam  Constitutional:       General: He is not in acute distress.     Comments: Frail, walker   HENT:      Head: Normocephalic and atraumatic.      Right Ear: Tympanic membrane normal.      Left Ear: Tympanic membrane normal.      Mouth/Throat:      Mouth: Mucous membranes are moist.      Pharynx: Oropharynx is clear.   Eyes:      Extraocular Movements: Extraocular movements intact.      Conjunctiva/sclera: Conjunctivae normal.      Pupils: Pupils are equal, round, and reactive to light.   Cardiovascular:      Rate and Rhythm: Normal rate and regular rhythm.   Pulmonary:      Breath sounds: Normal breath sounds.   Abdominal:      General: Bowel sounds are normal.      Palpations: Abdomen is soft. There is no mass.   Musculoskeletal:         General: Normal range of motion.      Cervical back: Neck supple. No tenderness.      Right lower leg: Edema (shin skin tear granulating) present.      Left lower leg: Edema present.   Skin:     General: Skin is warm and dry.   Neurological:      General: No focal deficit present.      Mental Status: He is oriented to person, place, and time.       Diagnostic Results   Lab Results   Component Value Date    GLUCOSE 147 (H) 02/09/2024    CALCIUM 8.8 02/09/2024     02/09/2024    K 4.8 02/09/2024    CO2 26 02/09/2024     02/09/2024    BUN 19 02/09/2024    CREATININE 0.90 02/09/2024     Lab Results   Component Value Date    ALT 12 02/09/2024    AST 21 02/09/2024    ALKPHOS 97 02/09/2024    BILITOT 0.8 02/09/2024     Lab Results   Component Value Date    WBC 6.0 02/09/2024    HGB 13.9 02/09/2024    HCT 45.9 02/09/2024     (H) 02/09/2024     02/09/2024     Lab Results   Component Value Date    CHOL 70 (L) 02/09/2024    CHOL 90 (L) 12/20/2022    CHOL 102 (L) 04/14/2022     Lab Results   Component Value Date    HDL 34.0 (L) 02/09/2024    HDL 41 12/20/2022    HDL 46  "04/14/2022     Lab Results   Component Value Date    LDLCALC 25 (L) 02/09/2024    LDLCALC 37 (L) 12/20/2022    LDLCALC 38 (L) 04/14/2022     Lab Results   Component Value Date    TRIG 56 02/09/2024    TRIG 59 12/20/2022    TRIG 90 04/14/2022     No components found for: \"CHOLHDL\"  Lab Results   Component Value Date    HGBA1C 5.7 (H) 02/09/2024     Other labs not included in the list above were reviewed either before or during this encounter.    History    Past Medical History:   Diagnosis Date    Congestive heart failure with left ventricular systolic dysfunction (LVSD) (CMS/HCC) 08/29/2023    Essential hypertension 03/04/2003    MSSA (methicillin susceptible Staphylococcus aureus) septicemia (CMS/HCC) 08/29/2023    Osteoarthrosis 08/29/2023    PAD (peripheral artery disease) (CMS/HCC) 08/29/2023    Personal history of malignant neoplasm of prostate 02/03/2022    History of malignant neoplasm of prostate    Personal history of other diseases of the digestive system 02/03/2022    History of GI bleed    Skin ulcer (CMS/HCC) 02/14/2024     Past Surgical History:   Procedure Laterality Date    OTHER SURGICAL HISTORY  02/03/2022    Atrial appendage closure device insertion    OTHER SURGICAL HISTORY  02/03/2022    Prostatectomy    OTHER SURGICAL HISTORY  02/03/2022    Knee replacement    OTHER SURGICAL HISTORY  02/03/2022    Hip surgery    US GUIDED PERCUTANEOUS PLACEMENT  12/11/2020    US GUIDED PERCUTANEOUS PLACEMENT [facility]     Family History   Problem Relation Name Age of Onset    Lung disease Mother       Social History     Socioeconomic History    Marital status:      Spouse name: Not on file    Number of children: Not on file    Years of education: Not on file    Highest education level: Not on file   Occupational History    Not on file   Tobacco Use    Smoking status: Never     Passive exposure: Never    Smokeless tobacco: Never   Vaping Use    Vaping Use: Never used   Substance and Sexual " Activity    Alcohol use: Yes    Drug use: Never    Sexual activity: Defer   Other Topics Concern    Not on file   Social History Narrative    Not on file     Social Determinants of Health     Financial Resource Strain: Not on file   Food Insecurity: Not on file   Transportation Needs: Not on file   Physical Activity: Not on file   Stress: Not on file   Social Connections: Not on file   Intimate Partner Violence: Not on file   Housing Stability: Not on file     Allergies   Allergen Reactions    Ceftriaxone Rash    Rifampin Rash     Current Outpatient Medications on File Prior to Visit   Medication Sig Dispense Refill    acetaminophen (TylenoL) 325 mg tablet Take 2 tablets (650 mg) by mouth if needed.      aspirin ([place] Aspirin) 81 mg chewable tablet Chew 1 tablet (81 mg) once daily.      atorvastatin (Lipitor) 40 mg tablet Take 1 tablet (40 mg) by mouth once daily.      dapagliflozin propanediol (Farxiga) 5 mg Take 1 tablet (5 mg) by mouth once daily.      digoxin (Lanoxin) 125 MCG tablet Take 1 tablet (125 mcg) by mouth once daily. 90 tablet 2    doxycycline (Adoxa) 100 mg tablet Take 1 tablet (100 mg) by mouth every 12 hours.      ferrous sulfate 325 (65 Fe) MG EC tablet Take 1 tablet (325 mg) by mouth every other day.  M-W-F 36 tablet 2    furosemide (Lasix) 40 mg tablet Take 1 tablet (40 mg) by mouth once daily. 90 tablet 2    levETIRAcetam (Keppra) 500 mg tablet Take 1 tablet (500 mg) by mouth 2 times a day.      losartan (Cozaar) 25 mg tablet TAKE 1 TABLET BY MOUTH EVERY DAY FOR 90 DAYS 90 tablet 2    metoprolol succinate XL (Toprol-XL) 50 mg 24 hr tablet Take 1 tablet (50 mg) by mouth once daily.      omeprazole (PriLOSEC) 20 mg DR capsule Take 1 capsule (20 mg) by mouth once daily in the morning. Take before meals. 90 capsule 2    potassium chloride CR 20 mEq ER tablet Take 1 tablet (20 mEq) by mouth once daily. 90 tablet 2    sodium chloride (Ocean) 0.65 % nasal spray Administer 2 sprays into each  nostril if needed. Every 2 hours      [DISCONTINUED] dimethicone-petrolatum 1-30 % cream Apply 1 Application topically 3 times a day.  to legs and feet      [DISCONTINUED] cyanocobalamin (Vitamin B-12) 1,000 mcg tablet Take 1 tablet (1,000 mcg) by mouth once daily.      [DISCONTINUED] docusate sodium (Colace) 100 mg capsule Take 1 capsule (100 mg) by mouth every other day.      [DISCONTINUED] ipratropium-albuteroL (Duo-Neb) 0.5-2.5 mg/3 mL nebulizer solution Inhale every 6 hours.      [DISCONTINUED] magnesium hydroxide (Milk of Magnesia) 400 mg/5 mL suspension Take 5 mL by mouth 4 times a day as needed. at least 4 hours between doses      [DISCONTINUED] metoprolol tartrate (Lopressor) 25 mg tablet Take by mouth.      [DISCONTINUED] mupirocin (Bactroban) 2 % ointment Mupirocin 2 % External Ointment   Quantity: 22  Refills: 0        Start : 11-Apr-2022   Active      [DISCONTINUED] pantoprazole (ProtoNix) 40 mg EC tablet Take 1 tablet (40 mg) by mouth once daily.       No current facility-administered medications on file prior to visit.     Immunization History   Administered Date(s) Administered    Flu vaccine, quadrivalent, high-dose, preservative free, age 65y+ (FLUZONE) 11/06/2020, 10/04/2022    Influenza, High Dose Seasonal, Preservative Free 10/06/2017, 10/17/2019    Influenza, Seasonal, Quadrivalent, Adjuvanted 10/17/2023    Influenza, Unspecified 10/10/2006    Influenza, seasonal, injectable 10/01/2011, 10/01/2012, 10/17/2013    Pfizer Purple Cap SARS-CoV-2 01/21/2021, 02/11/2021    Pneumococcal conjugate vaccine, 13-valent (PREVNAR 13) 05/29/2015    Pneumococcal polysaccharide vaccine, 23-valent, age 2 years and older (PNEUMOVAX 23) 01/01/2005, 12/07/2006, 04/11/2011    Tdap vaccine, age 7 year and older (BOOSTRIX, ADACEL) 02/27/2019    Zoster, live 12/01/2018     Patient's medical history was reviewed and updated either before or during this encounter.       Isaiah Ribera MD

## 2024-02-20 NOTE — PATIENT INSTRUCTIONS
see on follow up.    Diagnoses and all orders for this visit:  Congestive heart failure with left ventricular systolic dysfunction (LVSD) (CMS/[facility])  Comments:  Risk factor control.  Essential hypertension  Comments:  BP doing OK.  PAD (peripheral artery disease) (CMS/[facility])  MSSA (methicillin susceptible Staphylococcus aureus) septicemia (CMS/[facility])  Comments:  Doxycyline life long prophylaxis.  Skin ulcer, limited to breakdown of skin (CMS/[facility])  Comments:  Right shin skin tear. Use Xeroform gauze with Koban wrap. Soap /water daily.  Primary osteoarthritis involving multiple joints  Comments:  Dr.William Dixon [phone] [place] is good.  Impaired fasting blood sugar  -     CBC; Future  -     Comprehensive Metabolic Panel; Future  -     Hemoglobin A1C; Future  PAF (paroxysmal atrial fibrillation) (CMS/[facility])  -     Digoxin level; Future  Mixed hyperlipidemia  -     Lipid Panel; Future

## 2024-07-09 ENCOUNTER — APPOINTMENT (OUTPATIENT)
Dept: CARDIOLOGY | Facility: CLINIC | Age: 89
End: 2024-07-09
Payer: MEDICARE

## 2024-09-03 ENCOUNTER — APPOINTMENT (OUTPATIENT)
Dept: PRIMARY CARE | Facility: CLINIC | Age: 89
End: 2024-09-03
Payer: MEDICARE

## 2024-09-09 ENCOUNTER — APPOINTMENT (OUTPATIENT)
Dept: PRIMARY CARE | Facility: CLINIC | Age: 89
End: 2024-09-09
Payer: MEDICARE